# Patient Record
Sex: MALE | Race: WHITE
[De-identification: names, ages, dates, MRNs, and addresses within clinical notes are randomized per-mention and may not be internally consistent; named-entity substitution may affect disease eponyms.]

---

## 2021-03-08 ENCOUNTER — HOSPITAL ENCOUNTER (OUTPATIENT)
Dept: HOSPITAL 41 - JD.SDS | Age: 51
Setting detail: OBSERVATION
LOS: 1 days | Discharge: HOME | End: 2021-03-09
Attending: ORTHOPAEDIC SURGERY | Admitting: ORTHOPAEDIC SURGERY
Payer: COMMERCIAL

## 2021-03-08 DIAGNOSIS — M17.0: Primary | ICD-10-CM

## 2021-03-08 DIAGNOSIS — Z79.899: ICD-10-CM

## 2021-03-08 DIAGNOSIS — G89.18: ICD-10-CM

## 2021-03-08 DIAGNOSIS — Z20.822: ICD-10-CM

## 2021-03-08 DIAGNOSIS — I44.0: ICD-10-CM

## 2021-03-08 DIAGNOSIS — E66.9: ICD-10-CM

## 2021-03-08 DIAGNOSIS — R55: ICD-10-CM

## 2021-03-08 DIAGNOSIS — R35.1: ICD-10-CM

## 2021-03-08 DIAGNOSIS — I10: ICD-10-CM

## 2021-03-08 DIAGNOSIS — Z01.812: ICD-10-CM

## 2021-03-08 DIAGNOSIS — R73.9: ICD-10-CM

## 2021-03-08 DIAGNOSIS — N40.1: ICD-10-CM

## 2021-03-08 DIAGNOSIS — I49.1: ICD-10-CM

## 2021-03-08 PROCEDURE — C1713 ANCHOR/SCREW BN/BN,TIS/BN: HCPCS

## 2021-03-08 PROCEDURE — 27447 TOTAL KNEE ARTHROPLASTY: CPT

## 2021-03-08 PROCEDURE — 97116 GAIT TRAINING THERAPY: CPT

## 2021-03-08 PROCEDURE — G0378 HOSPITAL OBSERVATION PER HR: HCPCS

## 2021-03-08 PROCEDURE — 97161 PT EVAL LOW COMPLEX 20 MIN: CPT

## 2021-03-08 PROCEDURE — 97530 THERAPEUTIC ACTIVITIES: CPT

## 2021-03-08 PROCEDURE — 80053 COMPREHEN METABOLIC PANEL: CPT

## 2021-03-08 PROCEDURE — 97110 THERAPEUTIC EXERCISES: CPT

## 2021-03-08 PROCEDURE — 96365 THER/PROPH/DIAG IV INF INIT: CPT

## 2021-03-08 PROCEDURE — 85025 COMPLETE CBC W/AUTO DIFF WBC: CPT

## 2021-03-08 PROCEDURE — 93005 ELECTROCARDIOGRAM TRACING: CPT

## 2021-03-08 PROCEDURE — 36415 COLL VENOUS BLD VENIPUNCTURE: CPT

## 2021-03-08 PROCEDURE — C1776 JOINT DEVICE (IMPLANTABLE): HCPCS

## 2021-03-08 PROCEDURE — 97165 OT EVAL LOW COMPLEX 30 MIN: CPT

## 2021-03-08 PROCEDURE — 20610 DRAIN/INJ JOINT/BURSA W/O US: CPT

## 2021-03-08 PROCEDURE — 73560 X-RAY EXAM OF KNEE 1 OR 2: CPT

## 2021-03-08 PROCEDURE — 83735 ASSAY OF MAGNESIUM: CPT

## 2021-03-08 PROCEDURE — 96366 THER/PROPH/DIAG IV INF ADDON: CPT

## 2021-03-08 PROCEDURE — 84484 ASSAY OF TROPONIN QUANT: CPT

## 2021-03-08 RX ADMIN — CEFUROXIME SCH MG: 750 INJECTION, POWDER, FOR SOLUTION INTRAMUSCULAR; INTRAVENOUS at 07:44

## 2021-03-08 RX ADMIN — CEFUROXIME SCH MG: 750 INJECTION, POWDER, FOR SOLUTION INTRAMUSCULAR; INTRAVENOUS at 08:20

## 2021-03-08 RX ADMIN — TRIAMCINOLONE ACETONIDE ONE MG: 40 INJECTION, SUSPENSION INTRA-ARTICULAR; INTRAMUSCULAR at 08:37

## 2021-03-08 RX ADMIN — DEXTROSE ONE GM: 5 SOLUTION INTRAVENOUS at 08:25

## 2021-03-08 RX ADMIN — DEXTROSE ONE GM: 5 SOLUTION INTRAVENOUS at 07:44

## 2021-03-08 RX ADMIN — TRIAMCINOLONE ACETONIDE ONE MG: 40 INJECTION, SUSPENSION INTRA-ARTICULAR; INTRAMUSCULAR at 07:46

## 2021-03-08 NOTE — CR
Right knee: AP and lateral views of the right knee were obtained.

 

Comparison: Prior CT knee study of 01/29/21.

 

Right knee prosthesis is seen.  Components are aligned.  Patellar 

prosthesis is seen.  Soft tissue air is noted from the surgical 

procedure.  No acute osseous abnormality is appreciated.

 

Impression:

1.  Satisfactory postoperative radiographic appearance of recently 

placed right knee prosthesis.

 

Diagnostic code #2

## 2021-03-08 NOTE — PCM.SN.2
- Free Text/Narrative


Note: 





Right selective femoral nerve block at the adductor canal for post-procedure 

pain control under US guidance requested by Dr. Landers.


Time Out: 0855


Start: 0855


End: 0901





Chart reviewed.  Consent signed.  Questions answered. Appropriate monitors 

applied.  Time out performed. Right mid-shaft femur identified with ultrasound, 

scanning medially of femur, the femoral artery in the adductor canal visualized,

and the femoral nerve located laterally to the artery. The skin was prepped 

lateral to the ultrasound probe with chlorahexadine times two.  The 21ga 4 

insulated block needle was inserted under direct ultrasound guidance into the 

adductor canal.  25mL of 0.5% ropivacaine with 1:200,000 epinephrine was 

injected circumferentially around the nerve with intermittent negative 

aspiration noted.  Patient tolerated the procedure well.  Sterile technique 

noted along with sterile gloves, mask, and sterile probe cover.  See picture on 

progress note and vital signs on nurses notes.  Block completed in PACU.  


Silvestre Chavez CRNA

## 2021-03-08 NOTE — PCM.CONS
H&P History of Present Illness





- General


Date of Service: 03/08/21


Admit Problem/Dx: 


                           Admission Diagnosis/Problem





Admission Diagnosis/Problem      Irregular heart rhythm








Source of Information: Patient, Old Records, Provider, RN, RN Notes Reviewed, 

Significant Other


History Limitations: Reports: No Limitations





- History of Present Illness


Initial Comments - Free Text/Narative: 


Debora Chin is a 51 yo male patient of Dr. Landers who is post-operative day 0 of 

TKA with left knee cortisone injection. Hospital medicine was consulted for 

post-operative medical care after the patient had an episode of diaphoresis and 

possible near syncope postoperatively.  Patient was noted to have frequent PACs 

and a first-degree heart block on twelve-lead EKG.  Decision made to admit 

patient observation status for cardiac monitoring. At this time he is resting 

comfortably in bed. Pain is controlled.  He denies any chest pain, shortness of 

breath, palpitations, nausea, or vomiting.  He carries a history of: BPH, 

obesity, hypertension, hyperglycemia.  He is not a smoker.





He is a full code.  His primary care provider is Pat Fraser NP.








- Related Data


Allergies/Adverse Reactions: 


                                    Allergies











Allergy/AdvReac Type Severity Reaction Status Date / Time


 


No Known Allergies Allergy   Verified 03/08/21 06:34











Home Medications: 


                                    Home Meds





Acetaminophen [Tylenol Extra Strength] 1,000 mg PO Q6H PRN 03/07/21 [History]


Cholecalciferol (Vitamin D3) [Vitamin D3] 5,000 unit PO DAILY 03/07/21 [History]


Doxazosin [Cardura] 4 mg PO DAILY 03/07/21 [History]


Multivitamin 1 tab PO DAILY 03/07/21 [History]


Aspirin [Aspirin EC] 325 mg PO BID #84 tab 03/08/21 [Rx]


Cyclobenzaprine [Flexeril] 10 mg PO BID PRN #20 tab 03/08/21 [Rx]


oxyCODONE 5 - 10 mg PO Q4H PRN #40 tab 03/08/21 [Rx]











Past Medical History


HEENT History: Reports: None


Cardiovascular History: Reports: Hypertension, Other (See Below)


Other Cardiovascular History: right carotid artery bruit


Respiratory History: Reports: None


Gastrointestinal History: Reports: None


Genitourinary History: Reports: BPH, Renal Calculus


OB/GYN History: Reports: None


Musculoskeletal History: Reports: Osteoarthritis, Other (See Below)


Other Musculoskeletal History: left knee pain


Neurological History: Reports: None


Psychiatric History: Reports: None


Endocrine/Metabolic History: Reports: Obesity/BMI 30+


Hematologic History: Reports: None


Immunologic History: Reports: None


Oncologic (Cancer) History: Reports: None


Dermatologic History: Reports: None





- Infectious Disease History


Infectious Disease History: Reports: None





- Past Surgical History


Head Surgeries/Procedures: Reports: None


HEENT Surgical History: Reports: Oral Surgery


Cardiovascular Surgical History: Reports: None


Respiratory Surgical History: Reports: None


GI Surgical History: Reports: None


Male  Surgical History: Reports: None


Neurological Surgical History: Reports: None


Musculoskeletal Surgical History: Reports: None


Oncologic Surgical History: Reports: None


Dermatological Surgical History: Reports: None





Social & Family History





- Tobacco Use


Tobacco Use Status *Q: Never Tobacco User





- Caffeine Use


Caffeine Use: Reports: Coffee, Tea





- Recreational Drug Use


Recreational Drug Use: No


Drug Use in Last 12 Months: No





H&P Review of Systems





- Review of Systems:


Review Of Systems: See Below


General: Reports: Diaphoresis (improving ).  Denies: Fever, Chills, Malaise, 

Weakness, Fatigue


HEENT: Reports: No Symptoms.  Denies: Headaches, Sore Throat


Pulmonary: Reports: No Symptoms.  Denies: Shortness of Breath, Wheezing, 

Pleuritic Chest Pain, Cough, Sputum


Cardiovascular: Reports: No Symptoms.  Denies: Chest Pain, Palpitations, Dyspnea

 on Exertion


Gastrointestinal: Reports: No Symptoms.  Denies: Abdominal Pain, Constipation, 

Diarrhea, Nausea, Vomiting


Genitourinary: Reports: No Symptoms.  Denies: Pain


Musculoskeletal: Reports: Leg Pain


Skin: Reports: No Symptoms.  Denies: Cyanosis


Psychiatric: Reports: No Symptoms.  Denies: Confusion


Neurological: Reports: Numbness (2/2 anesthesia ), Difficulty Walking, Weakness,

 Gait Disturbance.  Denies: Confusion


Hematologic/Lymphatic: Reports: No Symptoms, Anemia


Immunologic: Reports: No Symptoms.  Denies: Anaphylaxis





Exam





- Exam


Exam: See Below





- Vital Signs


Vital Signs: 


                                Last Vital Signs











Temp  98.2 F   03/08/21 12:56


 


Pulse  78   03/08/21 11:47


 


Resp  19   03/08/21 11:47


 


BP  129/90   03/08/21 11:47


 


Pulse Ox  93 L  03/08/21 11:47











Weight: 229 lb 8.02 oz





- Exam


Quality Assessment: DVT Prophylaxis.  No: Supplemental Oxygen, Urinary Catheter


General: Alert, Oriented, Cooperative.  No: Mild Distress


HEENT: Conjunctiva Clear, EACs Clear, Mucosa Moist & Pink, Posterior Pharynx 

Clear


Neck: Supple, Trachea Midline


Lungs: Clear to Auscultation, Normal Respiratory Effort


Cardiovascular: Regular Rate, Irregular Rhythm


GI/Abdominal Exam: Normal Bowel Sounds, Soft, Non-Tender, No Distention


 (Male) Exam: Deferred


Rectal (Males) Exam: Deferred


Extremities: Normal Capillary Refill, Leg Pain, Limited Range of Motion, Other 

(Bandage in place on right leg.  Cooling pack in place.)


Peripheral Pulses: 3+: Radial (L), Radial (R), Dorsalis Pedis (L), Dorsalis 

Pedis (R)


Skin: Warm, Intact, Moist


Neurological: Cranial Nerves Intact (Grossly )


Neuro Extensive - Mental Status: Alert, Oriented x3, Normal Mood/Affect


Psychiatric: Alert, Normal Affect, Normal Mood





- Patient Data


Lab Results Last 24 hrs: 


                         Laboratory Results - last 24 hr











  03/08/21 Range/Units





  12:05 


 


Troponin I  < 0.017  (0.00-0.056)  ng/mL











Result Diagrams: 


                                 03/08/21 12:05





Sepsis Event Note





- Evaluation


Sepsis Screening Result: No Definite Risk





- Focused Exam


Vital Signs: 


                                   Vital Signs











  Temp Pulse Resp BP Pulse Ox Pulse Ox


 


 03/08/21 12:56  98.2 F     


 


 03/08/21 11:47   78  19  129/90  93 L 


 


 03/08/21 11:31   81  19  146/102 H  96 


 


 03/08/21 11:18   46 L  22 H  153/82 H  93 L 


 


 03/08/21 11:02   40 L  18  175/80 H  95 


 


 03/08/21 10:57   36 L  23 H  178/106 H  95 


 


 03/08/21 10:45   45 L  16  134/107 H  96 


 


 03/08/21 10:15  98.0 F  84  16  165/95 H  96 


 


 03/08/21 09:45  97.3 F  87  20  150/83 H  94 L 


 


 03/08/21 09:40  97.7 F  82  20  149/81 H  93 L 


 


 03/08/21 09:30       97


 


 03/08/21 09:25  97.7 F  84  12  148/86 H  96 


 


 03/08/21 09:20       96


 


 03/08/21 09:10   86  18  135/90  97 


 


 03/08/21 08:55  97.3 F  87  18  122/76  97 


 


 03/08/21 08:46       92 L


 


 03/08/21 08:42  98.2 F  97  16  113/63  96 


 


 03/08/21 06:36  97.7 F  99  16  148/82 H  99 














Consult PN Assessment/Plan


POD#: 0


Procedures: 


Procedures





CT LOWER EXTREMITY W/O DYE (01/29/21)








(1) S/P total knee arthroplasty


SNOMED Code(s): 7976046641182, 494423786, 0726982899754


   Code(s): Z96.659 - PRESENCE OF UNSPECIFIED ARTIFICIAL KNEE JOINT   Priority: 

High   Current Visit: Yes   


Qualifiers: 


   Laterality: right   Qualified Code(s): Z96.651 - Presence of right artificial

 knee joint   





(2) BPH (benign prostatic hyperplasia)


SNOMED Code(s): 995392035


   Code(s): N40.0 - BENIGN PROSTATIC HYPERPLASIA WITHOUT LOWER URINRY TRACT SYMP

   Priority: Low   Current Visit: No   


Qualifiers: 


   Lower urinary tract symptom presence: symptoms absent   Qualified Code(s): 

N40.0 - Benign prostatic hyperplasia without lower urinary tract symptoms   





(3) Obesity (BMI 30-39.9)


SNOMED Code(s): 354173078, 279069456


   Code(s): E66.9 - OBESITY, UNSPECIFIED   Priority: Medium   Current Visit: No 

  





(4) HTN (hypertension)


SNOMED Code(s): 36419855


   Code(s): I10 - ESSENTIAL (PRIMARY) HYPERTENSION   Priority: Medium   Current 

Visit: No   


Qualifiers: 


   Hypertension type: unspecified   Qualified Code(s): I10 - Essential (primary)

 hypertension   





(5) Hyperglycemia


SNOMED Code(s): 33529019


   Code(s): R73.9 - HYPERGLYCEMIA, UNSPECIFIED   Priority: Medium   Current 

Visit: No   





(6) Near syncope


SNOMED Code(s): 977294013


   Code(s): R55 - SYNCOPE AND COLLAPSE   Priority: High   Current Visit: Yes   





(7) PAC (premature atrial contraction)


SNOMED Code(s): 972658783


   Code(s): I49.1 - ATRIAL PREMATURE DEPOLARIZATION   Priority: High   Current 

Visit: Yes   





(8) First degree AV block


SNOMED Code(s): 577025479


   Code(s): I44.0 - ATRIOVENTRICULAR BLOCK, FIRST DEGREE   Priority: High   

Current Visit: Yes   


Problem List Initiated/Reviewed/Updated: Yes


My Orders Last 24 Hours: 


My Active Orders





03/08/21 11:42


Consult to Physician [CONS] Urgent 





03/08/21 12:07


Notify Provider Consults [RC] ASDIRECTED 





03/08/21 12:31


Admission Status [Patient Status] [ADT] Routine 





03/08/21 12:55


COMPREHENSIVE METABOLIC PN,CMP [CHEM] Routine 


MAGNESIUM [CHEM] Routine 





03/08/21 12:56


Consult to Occupational Therapy [OT Evaluation and Treatment] [CONS] Routine 


PT Evaluation and Treatment [CONS] Routine 





03/08/21 16:00


EKG 12 Lead [EKG Documentation Completion] [RC] ROUTINE 


TROPONIN I [CHEM] Q4H 





03/08/21 20:00


TROPONIN I [CHEM] Q4H 





03/09/21 05:11


CBC WITH AUTO DIFF [HEME] AM 


CMP [COMPREHENSIVE METABOLIC PN,CMP] [CHEM] AM 


MAGNESIUM [CHEM] AM 





03/09/21 09:00


Cholecalciferol (Vitamin D3)   5,000 unit PO DAILY 


Doxazosin [Cardura]   4 mg PO DAILY 


Multivitamin [Multivitamin]   1 tab PO DAILY 





03/10/21 05:11


CBC WITH AUTO DIFF [HEME] AM 


CMP [COMPREHENSIVE METABOLIC PN,CMP] [CHEM] AM 


MAGNESIUM [CHEM] AM 





03/11/21 05:11


CBC WITH AUTO DIFF [HEME] AM 


CMP [COMPREHENSIVE METABOLIC PN,CMP] [CHEM] AM 


MAGNESIUM [CHEM] AM 





03/12/21 05:11


CBC WITH AUTO DIFF [HEME] AM 


MAGNESIUM [CHEM] AM 











Plan: 


I/P:





Acute:





S/P right total knee arthroplasty - post-operative day 0


   -DVT prophylaxis and pain management per primary care team


   -PT/OT 


   -IS/RT


   -Monitor oxygen saturation


   -Titrate oxygen as needed


   -Home medications reviewed


   -Vital signs stable 


   -Monitor labs


      -Pre-operative Hgb was 14.8


      -Pre-operative GFR was 81


      -Pre-operative A1C was 6.4





Osteoarthritis of bilateral knees


   -Pain management per primary care team





S/P left knee steroid injection


   -Management per primary team





Near-syncope


Frequent PACs


First degree HB


   -Initial troponin negative


   -Telemetry


   -Check electrolytes


   -Observation status


   -Repeat 12-lead EKG tonight


   -Trend troponin


   -Recent stress test negative 





Chronic:


BPH


Obesity


HTN


Hyperglycemia





Plan:


CM for discharge planning


GI prophylaxis 


Home medications as indicated


Other orders as listed above 


Routine AM labs





He is a full code. His PCP is Pat Fraser NP





Thank you for allowing us to participate in the care of this patient!! 








Requesting Provider: Dr. Landers 


Date Consult Requested: 03/08/21


Patient History Reviewed: Yes


Admission H&P Reviewed: Yes


Notified Requestor: Yes


Time Spent (in minutes): 45

## 2021-03-08 NOTE — PCM.PREANE
Preanesthetic Assessment





- Procedure


Proposed Procedure: 





right total knee








- Anesthesia/Transfusion/Family Hx


Anesthesia History: Prior Anesthesia Without Reaction


Family History of Anesthesia Reaction: No


Transfusion History: No Prior Transfusion(s)





- Review of Systems


General: No Symptoms


Pulmonary: No Symptoms


Cardiovascular: No Symptoms


Gastrointestinal: No Symptoms


Neurological: No Symptoms


Other: Reports: None





- Physical Assessment


NPO Status Date: 03/07/21


NPO Status Time: 00:00


Vital Signs: 





                                Last Vital Signs











Temp  36.5 C   03/08/21 06:36


 


Pulse  99   03/08/21 06:36


 


Resp  16   03/08/21 06:36


 


BP  148/82 H  03/08/21 06:36


 


Pulse Ox  99   03/08/21 06:36











Height: 1.65 m


Weight: 104.1 kg


ASA Class: 2


Mental Status: Alert & Oriented x3


Airway Class: Mallampati = 2


Dentition: Reports: Normal Dentition


Thyro-Mental Finger Breadths: 3


Mouth Opening Finger Breadths: 3


ROM/Head Extension: Full


Lungs: Clear to Auscultation, Normal Respiratory Effort


Cardiovascular: Regular Rate, Regular Rhythm





- Allergies


Allergies/Adverse Reactions: 


                                    Allergies











Allergy/AdvReac Type Severity Reaction Status Date / Time


 


No Known Allergies Allergy   Verified 03/08/21 06:34














- Blood


Blood Available: No


Product(s) Available: None





- Anesthesia Plan


Pre-Op Medication Ordered: None





- Acknowledgements


Anesthesia Type Planned: Spinal


Pt an Appropriate Candidate for the Planned Anesthesia: Yes


Alternatives and Risks of Anesthesia Discussed w Pt/Guardian: Yes


Pt/Guardian Understands and Agrees with Anesthesia Plan: Yes





PreAnesthesia Questionnaire


HEENT History: Reports: None


Cardiovascular History: Reports: Hypertension, Other (See Below)


Other Cardiovascular History: right carotid artery bruit


Respiratory History: Reports: None


Gastrointestinal History: Reports: None


Genitourinary History: Reports: BPH, Renal Calculus


OB/GYN History: Reports: None


Musculoskeletal History: Reports: Osteoarthritis, Other (See Below)


Other Musculoskeletal History: left knee pain


Neurological History: Reports: None


Psychiatric History: Reports: None


Endocrine/Metabolic History: Reports: Obesity/BMI 30+


Hematologic History: Reports: None


Immunologic History: Reports: None


Oncologic (Cancer) History: Reports: None


Dermatologic History: Reports: None





- Infectious Disease History


Infectious Disease History: Reports: None





- Past Surgical History


Head Surgeries/Procedures: Reports: None


HEENT Surgical History: Reports: Oral Surgery


Cardiovascular Surgical History: Reports: None


Respiratory Surgical History: Reports: None


GI Surgical History: Reports: None


Female  Surgical History: Reports: None


Male  Surgical History: Reports: None


Neurological Surgical History: Reports: None


Musculoskeletal Surgical History: Reports: None


Oncologic Surgical History: Reports: None


Dermatological Surgical History: Reports: None





- SUBSTANCE USE


Tobacco Use Status *Q: Never Tobacco User


Recreational Drug Use History: No





- HOME MEDS


Home Medications: 


                                    Home Meds





Acetaminophen [Tylenol Extra Strength] 1,000 mg PO Q6H PRN 03/07/21 [History]


Cholecalciferol (Vitamin D3) [Vitamin D3] 5,000 unit PO DAILY 03/07/21 [History]


Doxazosin [Cardura] 4 mg PO DAILY 03/07/21 [History]


Multivitamin 1 tab PO DAILY 03/07/21 [History]


Aspirin [Aspirin EC] 325 mg PO BID #84 tab 03/08/21 [Rx]


Cyclobenzaprine [Flexeril] 10 mg PO BID PRN #20 tab 03/08/21 [Rx]


oxyCODONE 5 - 10 mg PO Q4H PRN #40 tab 03/08/21 [Rx]











- CURRENT (IN HOUSE) MEDS


Current Meds: 





                               Current Medications





Acetaminophen (Tylenol)  975 mg PO ONETIME [facility]


   Stop: 03/08/21 12:00


   Last Admin: 03/08/21 06:31 Dose:  975 mg


   Documented by: 


Morphine Sulfate 8 mg/Epinephrine HCl 0.3 mg/Cefuroxime Sodium 750 mg/Ketorolac 

Tromethamine 30 mg/Sodium Chloride 7.9 ml  0 mg .XX ONETIME [facility]


   Stop: 03/08/21 16:00


   Last Admin: 03/08/21 07:44 Dose:  788.3 mg


   Documented by: 


Lactated Ringer's (Ringers, Lactated)  1,000 mls @ 125 mls/hr IV ASDIRECTED [facility]


   Stop: 03/08/21 23:00


   Last Admin: 03/08/21 06:30 Dose:  125 mls/hr


   Documented by: 


Lidocaine/Sodium Bicarbonate (Buffered Lidocaine 1% In Ns 8.4%)  0.25 ml IDERM 

ONETIME PRN


   PRN Reason: Prior to IV Start


   Stop: 03/08/21 18:00


   Last Admin: 03/08/21 06:30 Dose:  0.25 ml


   Documented by: 


Oxycodone HCl (Oxycontin)  10 mg PO ONETIME [facility]


   Stop: 03/08/21 12:00


   Last Admin: 03/08/21 06:31 Dose:  10 mg


   Documented by: 


Pregabalin (Lyrica)  50 mg PO ONETIME [facility]


   Stop: 03/08/21 12:00


   Last Admin: 03/08/21 06:31 Dose:  50 mg


   Documented by: 


Sodium Chloride (Saline Flush)  10 ml FLUSH ASDIRECTED PRN


   PRN Reason: Keep Vein Open


   Stop: 03/08/21 18:00





Discontinued Medications





Bupivacaine HCl (Sensorcaine-Mpf 0.25%) Confirm Administered Dose 10 ml .ROUTE 

.STK-MED ONE


   Stop: 03/08/21 06:49


   Last Admin: 03/08/21 07:46 Dose:  4 ml


   Documented by: 


Cefazolin Sodium (Ancef) Confirm Administered Dose 2 gm .ROUTE .STK-MED ONE


   Stop: 03/08/21 06:37


Ephedrine Sulfate (Ephedrine Sulfate) Confirm Administered Dose 50 mg .ROUTE 

.STK-MED ONE


   Stop: 03/08/21 07:58


Epinephrine HCl (Adrenalin) Confirm Administered Dose 1 mg .ROUTE .STK-MED ONE


   Stop: 03/08/21 07:47


Fentanyl (Sublimaze) Confirm Administered Dose 100 mcg .ROUTE .STK-MED ONE


   Stop: 03/08/21 06:36


Lidocaine HCl (Xylocaine-Mpf 1%) Confirm Administered Dose 4 mls @ as directed 

.ROUTE .STK-MED ONE


   Stop: 03/08/21 06:36


Lactated Ringer's (Ringers, Lactated) Confirm Administered Dose 1,000 mls @ as 

directed .ROUTE .STK-MED ONE


   Stop: 03/08/21 07:13


Ketorolac Tromethamine (Toradol) Confirm Administered Dose 30 mg .ROUTE .STK-MED

 ONE


   Stop: 03/08/21 06:36


Labetalol HCl (Normodyne) Confirm Administered Dose 100 mg .ROUTE .STK-MED ONE


   Stop: 03/08/21 07:12


Midazolam HCl (Versed 1 Mg/Ml) Confirm Administered Dose 2 mg .ROUTE .STK-MED 

ONE


   Stop: 03/08/21 06:36


Ondansetron HCl (Zofran) Confirm Administered Dose 4 mg .ROUTE .STK-MED ONE


   Stop: 03/08/21 06:35


Propofol (Diprivan  20 Ml) Confirm Administered Dose 200 mg .ROUTE .STK-MED ONE


   Stop: 03/08/21 06:35


Propofol (Diprivan  20 Ml) Confirm Administered Dose 200 mg .ROUTE .STK-MED ONE


   Stop: 03/08/21 07:24


Propofol (Diprivan  20 Ml) Confirm Administered Dose 200 mg .ROUTE .STK-MED ONE


   Stop: 03/08/21 07:45


Propofol (Diprivan  20 Ml) Confirm Administered Dose 200 mg .ROUTE .STK-MED ONE


   Stop: 03/08/21 08:06


Propofol (Diprivan  20 Ml) Confirm Administered Dose 200 mg .ROUTE .STK-MED ONE


   Stop: 03/08/21 08:27


Ropivacaine (Naropin 0.5%) Confirm Administered Dose 30 ml .ROUTE .STK-MED ONE


   Stop: 03/08/21 07:46


Tranexamic Acid (Cyklokapron) Confirm Administered Dose 1,000 mg .ROUTE .STK-MED

 ONE


   Stop: 03/08/21 06:03


   Last Admin: 03/08/21 07:44 Dose:  1,000 mg


   Documented by: 


Triamcinolone Acetonide (Kenalog-40) Confirm Administered Dose 80 mg .ROUTE 

.STK-MED ONE


   Stop: 03/08/21 06:49


   Last Admin: 03/08/21 07:46 Dose:  80 mg


   Documented by: 


Vancomycin HCl (Vancomycin) Confirm Administered Dose 1 gm .ROUTE .STK-MED ONE


   Stop: 03/08/21 06:03


   Last Admin: 03/08/21 07:44 Dose:  1 gm


   Documented by:

## 2021-03-08 NOTE — PCM.POSTAN
POST ANESTHESIA ASSESSMENT





- MENTAL STATUS


Mental Status: Alert, Oriented





- VITAL SIGNS


Vital Signs: 


                                Last Vital Signs











Temp  36.5 C   03/08/21 06:36


 


Pulse  99   03/08/21 06:36


 


Resp  16   03/08/21 06:36


 


BP  148/82 H  03/08/21 06:36


 


Pulse Ox  99   03/08/21 06:36














- RESPIRATORY


Respiratory Status: Respiratory Rate WNL, Airway Patent, O2 Saturation Stable, 

Supplemental Oxygen





- CARDIOVASCULAR


CV Status: Pulse Rate WNL, Blood Pressure Stable





- GASTROINTESTINAL


GI Status: No Symptoms





- PAIN


Pain Score: 0





- POST OP HYDRATION


Hydration Status: Adequate & Stable





- OBSERVATIONS


Free Text/Narrative:: 





no anesthesia complications noted

## 2021-03-08 NOTE — PCM.OPNOTE
- General Post-Op/Procedure Note


Date of Surgery/Procedure: 03/08/21


Operative Procedure(s): right total knee arthroplasty with [employer] with 

left knee steroid injection


Pre Op Diagnosis: bilateral knee osteoarthrosis


Post-Op Diagnosis: Same


Anesthesia Technique: Local, MAC, Spinal


Primary Surgeon: Eder Landers


Anesthesia Provider: Silvestre Chavez


Assistant: Jennifer Olsen


Assistant: Gema Combs


EBL in mLs: 150


Complications: None


Condition: Good


Free Text/Narrative:: 


5/5


9mm


35x10

## 2021-03-09 NOTE — [id number]
Post Anesthesia Note





- EVALUATION WITHIN 48HRS OF ANESTHETIC


Vital Signs in Normal Range: Yes


Patient Participated in Evaluation: Yes


Respiratory Function Stable: Yes


Airway Patent: Yes


Cardiovascular Function Stable: Yes


Hydration Status Stable: Yes


Pain Control Satisfactory: Yes


Nausea and Vomiting Control Satisfactory: Yes


Mental Status Recovered: Yes


Vital Signs: 


                                Last Vital Signs











Temp  37.2 C   03/09/21 03:17


 


Pulse  99   03/09/21 03:17


 


Resp  18   03/09/21 03:17


 


BP  154/85 H  03/09/21 03:17


 


Pulse Ox  96   03/09/21 03:17














- COMMENTS/OBSERVATIONS


Free Text/Narrative:: 





no anesthesia complications noted

## 2021-03-09 NOTE — [id number]
**Discharge Summary





- Hospital Course


Brief History: Debora is a 51 yo male who underwent right TKA with left knee 

cortisone injection with Dr. Landers on 3-8-2021.  The procedure was completed 

under spinal anesthesia with sedation and a post-operative adductor canal block 

was provided.  The pt tolerated the procedure well, however, was noted to have 

frequent PACs and first degree heart block in PACU.  The patient was evaluated 

by the Hospitalist service and was admitted to the Medical-Surgical Unit with 

telemetry.  Medical management was provided by the Hospitalist service.  The 

pt's Hgb on POD#1 was 13.9.  On POD#1, 325mg ASA BID was initiated for VTE 

prophylaxis.  SCDs and TEDs were also ordered.  A Mepilex dressing was placed at

the incision sites at the time of surgery and remained clean and dry.  The pt 

participated in P.T. and O.T. and progressed well.  The pt was allowed to WBAT 

and used a FWW for mobility.  On POD#1, the pt was deemed appropriate to 

discharge to home with his wife as no further acute cardiac concerns were 

identified.  The patient will follow-up with his primary provider and will att

end outpatient physical therapy.


Diagnosis: Stroke: No





- Discharge Data


Discharge Date: 03/09/21


Discharge Disposition: Home, Self-Care 01


Condition: Good





- Referral to Home Health


Primary Care Physician: 


Thad Simon MD








- Patient Summary/Data


Operative Procedure(s) Performed: right total knee arthroplasty with michael 

robotics with left knee steroid injection


Consults: 


                                  Consultations





03/08/21 11:42


Consult to Physician [CONS] Urgent 





03/08/21 12:56


Consult to Occupational Therapy [OT Evaluation and Treatment] [CONS] Routine 


PT Evaluation and Treatment [CONS] Routine 














- Patient Instructions


Diet: Usual Diet as Tolerated


Activity: Apply Ice, As Tolerated, Elevate Extremity, Full Weight Bearing


Driving: Do Not Drive


Showering/Bathing: May Shower


Wound/Incision Care: Keep Operative Site/Wound Site Clean and Dry, Do NOT Change

 Dressing


Notify Provider of: Fever, Increased Pain, Swelling and Redness, Drainage, 

Nausea and/or Vomiting


Other/Special Instructions: Please get up and moving around EVERY HOUR while 

awake.  This helps to prevent blood clots.  Please use your walker and have help

 with mobility as needed.  Take a short walk in your home every hour while 

awake.  Starting on 3-9-2021, please take 325mg aspirin TWICE daily.  The 

aspirin is being used for blood clot prevention and not for pain management so 

please do not miss a dose of the medication.  You could use a medication like 

Pepcid or Tagamet and a medication like Prilosec or Nexium to protect your 

stomach while you are using the aspirin.  At home, please complete the exercises

 that you learned after surgery.  Schedule for physical therapy.  Use the pain 

medication as needed.  The medication may cause drowsiness and constipation.  

Contact your primary care provider for instructions if you are constipated.  You

 may use a stool softener like docusate sodium or Colace 100mg twice daily 

and/or a laxative like Miralax daily for constipation.  Increase your water and 

fiber intake while you are using the pain medication.  Please discontinue use of

 the prescription pain medication as soon as able.  ****The goal is to use the 

least amount of prescription pain medication as possible and to discontinue use 

of the prescription pain medication as soon as possible.  Please do not use 

other medications that may cause drowsiness (other pain medications, anxiety 

pills, cold medications, sleeping pills, etc) while using the prescription pain 

medication.  Do not use alcohol while using the pain medication.  If your 

primary care provider allows, you may use acetaminophen or Tylenol for pain 

management, however, please ensure you are not using over 4000 mg or 4 grams of 

acetaminophen per day.  Discuss use of acetaminophen with your primary care 

provider.  At this time, please do not use ibuprofen (Motrin, Advil) or naproxen

 (Aleve) for pain management as you are using the aspirin.  When the aspirin 

course is completed in 4 to 6 weeks, you could use ibuprofen or naproxen for 

pain management (if this is allowed by your primary care provider).  On the day 

following surgery, you may remove the ACE bandage on the surgical limb and put 

on the EDUARDO hose.  If you can tolerate use of the EDUARDO hose, wear them during the 

day and remove them at night.  Elevate the limb to decrease swelling.  Elevating

 the limb above the level of the heart will be most effective.  Elevating the 

foot higher than the knee will help to decrease swelling in the foot.  Place ice

 to the area often.  Place a towel between your skin and the blue pad.  Please 

keep the dressing in place until follow-up.  As long as the dressing is sealed 

and without a hole, the dressing is water resistant and therefore, you may have 

a shower.  Please do not soak that dressing in a tub, pool, whirlpool.  Notify 

the Clinic if the dressing becomes saturated.  Increase your protein intake 

while you are healing.  It is normal to have swelling and bruising at the 

surgical site, as well as above and below the surgical site.  If you have nimisha

betes or have been instructed by your primary care provider to monitor your 

blood sugars, please closely monitor your sugars.  Notify your primary care 

provider of the values.  Elevated sugars can increase the risk of infection.  If

 you have questions or concerns, please call [phone] and leave a message 

for the nurse.  Your call will be returned.  Follow-up with primary care 

provider within 5-7 days of discharge, sooner if needed.





- Discharge Plan


*PRESCRIPTION DRUG MONITORING PROGRAM REVIEWED*: No


*COPY OF PRESCRIPTION DRUG MONITORING REPORT IN PATIENT KANWAL: No


Prescriptions/Med Rec: 


Aspirin [Aspirin EC] 325 mg PO BID #84 tab


Cyclobenzaprine [Flexeril] 10 mg PO BID PRN #20 tab


 PRN Reason: Spasms


oxyCODONE 5 - 10 mg PO Q4H PRN #40 tab


 PRN Reason: Pain


Home Medications: 


                                    Home Meds





Acetaminophen [Tylenol Extra Strength] 1,000 mg PO Q6H PRN 03/07/21 [History]


Cholecalciferol (Vitamin D3) [Vitamin D3] 5,000 unit PO DAILY 03/07/21 [History]


Doxazosin [Cardura] 4 mg PO DAILY 03/07/21 [History]


Multivitamin 1 tab PO DAILY 03/07/21 [History]


Aspirin [Aspirin EC] 325 mg PO BID #84 tab 03/08/21 [Rx]


Cyclobenzaprine [Flexeril] 10 mg PO BID PRN #20 tab 03/08/21 [Rx]


oxyCODONE 5 - 10 mg PO Q4H PRN #40 tab 03/08/21 [Rx]








Patient Handouts:  Cyclobenzaprine tablets, Oxycodone tablets or capsules, Total

 Knee Replacement, Care After, Aspirin, ASA oral tablets


Referrals: 


Thad Simon MD [Primary Care Provider] - 03/17/21 3:30 pm


(Go to the [facility] to be seen.


)


Jennifer Olsen PA-C [Physician Assistant] - 


(Debora, you are scheduled to followup with Jennifer Olsen PA-C as listed:





-- March 16th at 11:45am


-- March 26th at 11:45am


-- April 20th at 11:00am)





- Discharge Summary/Plan Comment


DC Time >30 min.: No





- Patient Data


Vitals - Most Recent: 


                                Last Vital Signs











Temp  99.0 F   03/09/21 03:17


 


Pulse  95   03/09/21 07:52


 


Resp  16   03/09/21 07:52


 


BP  153/99 H  03/09/21 08:08


 


Pulse Ox  95   03/09/21 07:52











Weight - Most Recent: 234 lb


I&O - Last 24 hours: 


                                 Intake & Output











 03/09/21 03/09/21 03/09/21





 06:59 14:59 22:59


 


Intake Total 850  


 


Balance 850  











Lab Results - Last 24 hrs: 


                         Laboratory Results - last 24 hr











  03/08/21 03/08/21 03/09/21 Range/Units





  16:24 20:00 06:10 


 


WBC    15.20 H  (4.23-9.07)  K/mm3


 


RBC    4.57 L  (4.63-6.08)  M/mm3


 


Hgb    13.9  (13.7-17.5)  gm/dl


 


Hct    41.7  (40.1-51.0)  %


 


MCV    91.2  (79.0-92.2)  fl


 


MCH    30.4  (25.7-32.2)  pg


 


MCHC    33.3  (32.2-35.5)  g/dl


 


RDW Std Deviation    42.0  (35.1-43.9)  fL


 


Plt Count    258  (163-337)  K/mm3


 


MPV    10.6  (9.4-12.3)  fl


 


Neut % (Auto)    86.9 H  (34.0-67.9)  %


 


Lymph % (Auto)    4.7 L  (21.8-53.1)  %


 


Mono % (Auto)    8.3  (5.3-12.2)  %


 


Eos % (Auto)    0 L  (0.8-7.0)  


 


Baso % (Auto)    0.0 L  (0.1-1.2)  %


 


Neut # (Auto)    13.21 H  (1.78-5.38)  K/mm3


 


Lymph # (Auto)    0.71 L  (1.32-3.57)  K/mm3


 


Mono # (Auto)    1.26 H  (0.30-0.82)  K/mm3


 


Eos # (Auto)    0.00 L  (0.04-0.54)  K/mm3


 


Baso # (Auto)    0.00 L  (0.01-0.08)  K/mm3


 


Manual Slide Review    Abnormal smear  


 


Sodium     (136-145)  mEq/L


 


Potassium     (3.5-5.1)  mEq/L


 


Chloride     ()  mEq/L


 


Carbon Dioxide     (21-32)  mEq/L


 


Anion Gap     (5-15)  


 


BUN     (7-18)  mg/dL


 


Creatinine     (0.7-1.3)  mg/dL


 


Est Cr Clr Drug Dosing     mL/min


 


Estimated GFR (MDRD)     (>60)  mL/min


 


BUN/Creatinine Ratio     (14-18)  


 


Glucose     ()  mg/dL


 


Calcium     (8.5-10.1)  mg/dL


 


Magnesium     (1.8-2.4)  mg/dl


 


Total Bilirubin     (0.2-1.0)  mg/dL


 


AST     (15-37)  U/L


 


ALT     (16-63)  U/L


 


Alkaline Phosphatase     ()  U/L


 


Troponin I  < 0.017  < 0.017   (0.00-0.056)  ng/mL


 


Total Protein     (6.4-8.2)  g/dl


 


Albumin     (3.4-5.0)  g/dl


 


Globulin     gm/dL


 


Albumin/Globulin Ratio     (1-2)  














  03/09/21 Range/Units





  06:10 


 


WBC   (4.23-9.07)  K/mm3


 


RBC   (4.63-6.08)  M/mm3


 


Hgb   (13.7-17.5)  gm/dl


 


Hct   (40.1-51.0)  %


 


MCV   (79.0-92.2)  fl


 


MCH   (25.7-32.2)  pg


 


MCHC   (32.2-35.5)  g/dl


 


RDW Std Deviation   (35.1-43.9)  fL


 


Plt Count   (163-337)  K/mm3


 


MPV   (9.4-12.3)  fl


 


Neut % (Auto)   (34.0-67.9)  %


 


Lymph % (Auto)   (21.8-53.1)  %


 


Mono % (Auto)   (5.3-12.2)  %


 


Eos % (Auto)   (0.8-7.0)  


 


Baso % (Auto)   (0.1-1.2)  %


 


Neut # (Auto)   (1.78-5.38)  K/mm3


 


Lymph # (Auto)   (1.32-3.57)  K/mm3


 


Mono # (Auto)   (0.30-0.82)  K/mm3


 


Eos # (Auto)   (0.04-0.54)  K/mm3


 


Baso # (Auto)   (0.01-0.08)  K/mm3


 


Manual Slide Review   


 


Sodium  142  (136-145)  mEq/L


 


Potassium  4.3  (3.5-5.1)  mEq/L


 


Chloride  105  ()  mEq/L


 


Carbon Dioxide  27  (21-32)  mEq/L


 


Anion Gap  14.3  (5-15)  


 


BUN  15  (7-18)  mg/dL


 


Creatinine  1.0  (0.7-1.3)  mg/dL


 


Est Cr Clr Drug Dosing  76.02  mL/min


 


Estimated GFR (MDRD)  > 60  (>60)  mL/min


 


BUN/Creatinine Ratio  15.0  (14-18)  


 


Glucose  199 H  ()  mg/dL


 


Calcium  8.8  (8.5-10.1)  mg/dL


 


Magnesium  2.1  (1.8-2.4)  mg/dl


 


Total Bilirubin  0.5  (0.2-1.0)  mg/dL


 


AST  19  (15-37)  U/L


 


ALT  31  (16-63)  U/L


 


Alkaline Phosphatase  81  ()  U/L


 


Troponin I   (0.00-0.056)  ng/mL


 


Total Protein  6.6  (6.4-8.2)  g/dl


 


Albumin  3.4  (3.4-5.0)  g/dl


 


Globulin  3.2  gm/dL


 


Albumin/Globulin Ratio  1.1  (1-2)  











Med Orders - Current: 


                               Current Medications





Aspirin (Ecotrin)  325 mg PO BID [facility]


Bisacodyl (Dulcolax)  5 mg PO DAILY PRN


   PRN Reason: Constipation


Cholecalciferol (Vitamin D3)  5,000 unit PO DAILY [facility]


   Last Admin: 03/09/21 08:08 Dose:  5,000 unit


   Documented by: 


Cyclobenzaprine HCl (Flexeril)  10 mg PO BID PRN


   PRN Reason: Muscle Spasm


Docusate Sodium (Colace)  100 mg PO BID PRN


   PRN Reason: Constipation


Doxazosin Mesylate (Cardura)  4 mg PO DAILY [facility]


   Last Admin: 03/09/21 08:08 Dose:  4 mg


   Documented by: 


Famotidine (Pepcid)  20 mg PO Q12H [facility]


   Last Admin: 03/09/21 08:08 Dose:  20 mg


   Documented by: 


Cefazolin Sodium/Dextrose 2 gm (/ Premix)  50 mls @ 100 mls/hr IV Q8H [facility]


   Stop: 03/09/21 09:29


   Last Admin: 03/09/21 08:09 Dose:  100 mls/hr


   Documented by: 


Magnesium Hydroxide (Milk Of Magnesia)  30 ml PO BID PRN


   PRN Reason: Constipation


Morphine Sulfate (Morphine)  2 mg IVPUSH Q2H PRN


   PRN Reason: Breakthrough Pain


Multivitamins (Thera)  1 each PO DAILY [facility]


   Last Admin: 03/09/21 08:08 Dose:  1 each


   Documented by: 


Naloxone HCl (Narcan)  0.1 mg IVPUSH Q5M PRN


   PRN Reason: Oversedation


Ondansetron HCl (Zofran)  4 mg IVPUSH Q6H PRN


   PRN Reason: Nausea/Vomiting


Oxycodone HCl (Oxycodone)  5 - 10 mg PO Q4H PRN


   PRN Reason: Pain


   Last Admin: 03/09/21 08:08 Dose:  10 mg


   Documented by: 


Senna (Senna)  8.6 mg PO BID PRN


   PRN Reason: Constipation





Discontinued Medications





Acetaminophen (Tylenol)  975 mg PO ONETIME [facility]


   Stop: 03/08/21 12:00


   Last Admin: 03/08/21 06:31 Dose:  975 mg


   Documented by: 


Bupivacaine HCl (Sensorcaine-Mpf 0.25%) Confirm Administered Dose 10 ml .ROUTE 

.STK-MED ONE


   Stop: 03/08/21 06:49


   Last Admin: 03/08/21 08:37 Dose:  4 ml


   Documented by: 


Cefazolin Sodium (Ancef) Confirm Administered Dose 2 gm .ROUTE .STK-MED ONE


   Stop: 03/08/21 06:37


Morphine Sulfate 8 mg/Epinephrine HCl 0.3 mg/Cefuroxime Sodium 750 mg/Ketorolac 

Tromethamine 30 mg/Sodium Chloride 7.9 ml  0 mg .XX ONETIME [facility]


   Stop: 03/08/21 16:00


   Last Admin: 03/08/21 08:20 Dose:  788.3 mg


   Documented by: 


Ephedrine Sulfate (Ephedrine Sulfate) Confirm Administered Dose 50 mg .ROUTE 

.STK-MED ONE


   Stop: 03/08/21 07:58


Epinephrine HCl (Adrenalin) Confirm Administered Dose 1 mg .ROUTE .ST-MED ONE


   Stop: 03/08/21 07:47


Fentanyl (Sublimaze) Confirm Administered Dose 100 mcg .ROUTE .ST-MED ONE


   Stop: 03/08/21 06:36


Lactated Ringer's (Ringers, Lactated)  1,000 mls @ 125 mls/hr IV ASDIRECTED [facility]A


   Stop: 03/08/21 23:00


   Last Admin: 03/08/21 09:29 Dose:  125 mls/hr


   Documented by: 


Lidocaine HCl (Xylocaine-Mpf 1%) Confirm Administered Dose 4 mls @ as directed 

.ROUTE .ST-MED ONE


   Stop: 03/08/21 06:36


Lactated Ringer's (Ringers, Lactated) Confirm Administered Dose 1,000 mls @ as 

directed .ROUTE .ST-[facility] ONE


   Stop: 03/08/21 07:13


Ketorolac Tromethamine (Toradol) Confirm Administered Dose 30 mg .ROUTE .ST-MED

ONE


   Stop: 03/08/21 06:36


Labetalol HCl (Normodyne) Confirm Administered Dose 100 mg .ROUTE .ST-MED ONE


   Stop: 03/08/21 07:12


Lidocaine/Sodium Bicarbonate (Buffered Lidocaine 1% In Ns 8.4%)  0.25 ml IDERM 

ONETIME PRN


   PRN Reason: Prior to IV Start


   Stop: 03/08/21 18:00


   Last Admin: 03/08/21 06:30 Dose:  0.25 ml


   Documented by: 


Magnesium Oxide (Magnesium Oxide)  400 mg PO ONETIME ONE


   Stop: 03/08/21 15:46


   Last Admin: 03/08/21 16:20 Dose:  400 mg


   Documented by: 


Magnesium Oxide (Magnesium Oxide)  400 mg PO ONETIME ONE


   Stop: 03/08/21 21:01


   Last Admin: 03/08/21 21:23 Dose:  400 mg


   Documented by: 


Midazolam HCl (Versed 1 Mg/Ml) Confirm Administered Dose 2 mg .ROUTE .STK-MED 

ONE


   Stop: 03/08/21 06:36


Ondansetron HCl (Zofran) Confirm Administered Dose 4 mg .ROUTE .STK-MED ONE


   Stop: 03/08/21 06:35


Oxycodone HCl (Oxycontin)  10 mg PO ONETIME [facility]


   Stop: 03/08/21 12:00


   Last Admin: 03/08/21 06:31 Dose:  10 mg


   Documented by: 


Oxycodone HCl (Oxycodone)  5 - 10 mg PO Q4H PRN


   PRN Reason: Pain


Pregabalin (Lyrica)  50 mg PO ONETIME [facility]


   Stop: 03/08/21 12:00


   Last Admin: 03/08/21 06:31 Dose:  50 mg


   Documented by: 


Propofol (Diprivan  20 Ml) Confirm Administered Dose 200 mg .ROUTE .STK-MED ONE


   Stop: 03/08/21 06:35


Propofol (Diprivan  20 Ml) Confirm Administered Dose 200 mg .ROUTE .STK-MED ONE


   Stop: 03/08/21 07:24


Propofol (Diprivan  20 Ml) Confirm Administered Dose 200 mg .ROUTE .STK-MED ONE


   Stop: 03/08/21 07:45


Propofol (Diprivan  20 Ml) Confirm Administered Dose 200 mg .ROUTE .STK-MED ONE


   Stop: 03/08/21 08:06


Propofol (Diprivan  20 Ml) Confirm Administered Dose 200 mg .ROUTE .STK-MED ONE


   Stop: 03/08/21 08:27


Ropivacaine (Naropin 0.5%) Confirm Administered Dose 30 ml .ROUTE .STK-MED ONE


   Stop: 03/08/21 07:46


Sodium Chloride (Saline Flush)  10 ml FLUSH ASDIRECTED PRN


   PRN Reason: Keep Vein Open


   Stop: 03/08/21 18:00


Tranexamic Acid (Cyklokapron) Confirm Administered Dose 1,000 mg .ROUTE .STK-MED

ONE


   Stop: 03/08/21 06:03


   Last Admin: 03/08/21 08:25 Dose:  1,000 mg


   Documented by: 


Triamcinolone Acetonide (Kenalog-40) Confirm Administered Dose 80 mg .ROUTE 

.STK-MED ONE


   Stop: 03/08/21 06:49


   Last Admin: 03/08/21 08:37 Dose:  80 mg


   Documented by: 


Vancomycin HCl (Vancomycin) Confirm Administered Dose 1 gm .ROUTE .STK-MED ONE


   Stop: 03/08/21 06:03


   Last Admin: 03/08/21 08:25 Dose:  1 gm


   Documented by:

## 2021-03-09 NOTE — PCM.SURGPN
- General Info


Date of Service: 03/09/21


POD#: 1


Functional Status: Reports: Pain Controlled, Tolerating Diet, Ambulating, 

Urinating, Incentive Spirometry, Other (The pt states he is doing well and feels

prepared for d/c to home.)





- Patient Data


Vitals - Most Recent: 


                                Last Vital Signs











Temp  99.0 F   03/09/21 03:17


 


Pulse  95   03/09/21 07:52


 


Resp  16   03/09/21 07:52


 


BP  153/99 H  03/09/21 08:08


 


Pulse Ox  95   03/09/21 07:52











Weight - Most Recent: 234 lb


I&O - Last 24 Hours: 


                                 Intake & Output











 03/09/21 03/09/21 03/09/21





 06:59 14:59 22:59


 


Intake Total 850  


 


Balance 850  











Lab Results Last 24 Hrs: 


                         Laboratory Results - last 24 hr











  03/08/21 03/08/21 03/09/21 Range/Units





  16:24 20:00 06:10 


 


WBC    15.20 H  (4.23-9.07)  K/mm3


 


RBC    4.57 L  (4.63-6.08)  M/mm3


 


Hgb    13.9  (13.7-17.5)  gm/dl


 


Hct    41.7  (40.1-51.0)  %


 


MCV    91.2  (79.0-92.2)  fl


 


MCH    30.4  (25.7-32.2)  pg


 


MCHC    33.3  (32.2-35.5)  g/dl


 


RDW Std Deviation    42.0  (35.1-43.9)  fL


 


Plt Count    258  (163-337)  K/mm3


 


MPV    10.6  (9.4-12.3)  fl


 


Neut % (Auto)    86.9 H  (34.0-67.9)  %


 


Lymph % (Auto)    4.7 L  (21.8-53.1)  %


 


Mono % (Auto)    8.3  (5.3-12.2)  %


 


Eos % (Auto)    0 L  (0.8-7.0)  


 


Baso % (Auto)    0.0 L  (0.1-1.2)  %


 


Neut # (Auto)    13.21 H  (1.78-5.38)  K/mm3


 


Lymph # (Auto)    0.71 L  (1.32-3.57)  K/mm3


 


Mono # (Auto)    1.26 H  (0.30-0.82)  K/mm3


 


Eos # (Auto)    0.00 L  (0.04-0.54)  K/mm3


 


Baso # (Auto)    0.00 L  (0.01-0.08)  K/mm3


 


Manual Slide Review    Abnormal smear  


 


Sodium     (136-145)  mEq/L


 


Potassium     (3.5-5.1)  mEq/L


 


Chloride     ()  mEq/L


 


Carbon Dioxide     (21-32)  mEq/L


 


Anion Gap     (5-15)  


 


BUN     (7-18)  mg/dL


 


Creatinine     (0.7-1.3)  mg/dL


 


Est Cr Clr Drug Dosing     mL/min


 


Estimated GFR (MDRD)     (>60)  mL/min


 


BUN/Creatinine Ratio     (14-18)  


 


Glucose     ()  mg/dL


 


Calcium     (8.5-10.1)  mg/dL


 


Magnesium     (1.8-2.4)  mg/dl


 


Total Bilirubin     (0.2-1.0)  mg/dL


 


AST     (15-37)  U/L


 


ALT     (16-63)  U/L


 


Alkaline Phosphatase     ()  U/L


 


Troponin I  < 0.017  < 0.017   (0.00-0.056)  ng/mL


 


Total Protein     (6.4-8.2)  g/dl


 


Albumin     (3.4-5.0)  g/dl


 


Globulin     gm/dL


 


Albumin/Globulin Ratio     (1-2)  














  03/09/21 Range/Units





  06:10 


 


WBC   (4.23-9.07)  K/mm3


 


RBC   (4.63-6.08)  M/mm3


 


Hgb   (13.7-17.5)  gm/dl


 


Hct   (40.1-51.0)  %


 


MCV   (79.0-92.2)  fl


 


MCH   (25.7-32.2)  pg


 


MCHC   (32.2-35.5)  g/dl


 


RDW Std Deviation   (35.1-43.9)  fL


 


Plt Count   (163-337)  K/mm3


 


MPV   (9.4-12.3)  fl


 


Neut % (Auto)   (34.0-67.9)  %


 


Lymph % (Auto)   (21.8-53.1)  %


 


Mono % (Auto)   (5.3-12.2)  %


 


Eos % (Auto)   (0.8-7.0)  


 


Baso % (Auto)   (0.1-1.2)  %


 


Neut # (Auto)   (1.78-5.38)  K/mm3


 


Lymph # (Auto)   (1.32-3.57)  K/mm3


 


Mono # (Auto)   (0.30-0.82)  K/mm3


 


Eos # (Auto)   (0.04-0.54)  K/mm3


 


Baso # (Auto)   (0.01-0.08)  K/mm3


 


Manual Slide Review   


 


Sodium  142  (136-145)  mEq/L


 


Potassium  4.3  (3.5-5.1)  mEq/L


 


Chloride  105  ()  mEq/L


 


Carbon Dioxide  27  (21-32)  mEq/L


 


Anion Gap  14.3  (5-15)  


 


BUN  15  (7-18)  mg/dL


 


Creatinine  1.0  (0.7-1.3)  mg/dL


 


Est Cr Clr Drug Dosing  76.02  mL/min


 


Estimated GFR (MDRD)  > 60  (>60)  mL/min


 


BUN/Creatinine Ratio  15.0  (14-18)  


 


Glucose  199 H  ()  mg/dL


 


Calcium  8.8  (8.5-10.1)  mg/dL


 


Magnesium  2.1  (1.8-2.4)  mg/dl


 


Total Bilirubin  0.5  (0.2-1.0)  mg/dL


 


AST  19  (15-37)  U/L


 


ALT  31  (16-63)  U/L


 


Alkaline Phosphatase  81  ()  U/L


 


Troponin I   (0.00-0.056)  ng/mL


 


Total Protein  6.6  (6.4-8.2)  g/dl


 


Albumin  3.4  (3.4-5.0)  g/dl


 


Globulin  3.2  gm/dL


 


Albumin/Globulin Ratio  1.1  (1-2)  











Med Orders - Current: 


                               Current Medications





Aspirin (Ecotrin)  325 mg PO BID [facility]


Bisacodyl (Dulcolax)  5 mg PO DAILY PRN


   PRN Reason: Constipation


Cholecalciferol (Vitamin D3)  5,000 unit PO DAILY [facility]


   Last Admin: 03/09/21 08:08 Dose:  5,000 unit


   Documented by: 


Cyclobenzaprine HCl (Flexeril)  10 mg PO BID PRN


   PRN Reason: Muscle Spasm


Docusate Sodium (Colace)  100 mg PO BID PRN


   PRN Reason: Constipation


Doxazosin Mesylate (Cardura)  4 mg PO DAILY [facility]


   Last Admin: 03/09/21 08:08 Dose:  4 mg


   Documented by: 


Famotidine (Pepcid)  20 mg PO Q12H [facility]


   Last Admin: 03/09/21 08:08 Dose:  20 mg


   Documented by: 


Cefazolin Sodium/Dextrose 2 gm (/ Premix)  50 mls @ 100 mls/hr IV Q8H [facility]


   Stop: 03/09/21 09:29


   Last Admin: 03/09/21 08:09 Dose:  100 mls/hr


   Documented by: 


Magnesium Hydroxide (Milk Of Magnesia)  30 ml PO BID PRN


   PRN Reason: Constipation


Morphine Sulfate (Morphine)  2 mg IVPUSH Q2H PRN


   PRN Reason: Breakthrough Pain


Multivitamins (Thera)  1 each PO DAILY [facility]


   Last Admin: 03/09/21 08:08 Dose:  1 each


   Documented by: 


Naloxone HCl (Narcan)  0.1 mg IVPUSH Q5M PRN


   PRN Reason: Oversedation


Ondansetron HCl (Zofran)  4 mg IVPUSH Q6H PRN


   PRN Reason: Nausea/Vomiting


Oxycodone HCl (Oxycodone)  5 - 10 mg PO Q4H PRN


   PRN Reason: Pain


   Last Admin: 03/09/21 08:08 Dose:  10 mg


   Documented by: 


Senna (Senna)  8.6 mg PO BID PRN


   PRN Reason: Constipation





Discontinued Medications





Acetaminophen (Tylenol)  975 mg PO ONETIME [facility]


   Stop: 03/08/21 12:00


   Last Admin: 03/08/21 06:31 Dose:  975 mg


   Documented by: 


Bupivacaine HCl (Sensorcaine-Mpf 0.25%) Confirm Administered Dose 10 ml .ROUTE 

.STK-MED ONE


   Stop: 03/08/21 06:49


   Last Admin: 03/08/21 08:37 Dose:  4 ml


   Documented by: 


Cefazolin Sodium (Ancef) Confirm Administered Dose 2 gm .ROUTE .STK-MED ONE


   Stop: 03/08/21 06:37


Morphine Sulfate 8 mg/Epinephrine HCl 0.3 mg/Cefuroxime Sodium 750 mg/Ketorolac 

Tromethamine 30 mg/Sodium Chloride 7.9 ml  0 mg .XX ONETIME [facility]


   Stop: 03/08/21 16:00


   Last Admin: 03/08/21 08:20 Dose:  788.3 mg


   Documented by: 


Ephedrine Sulfate (Ephedrine Sulfate) Confirm Administered Dose 50 mg .ROUTE 

.STK-MED ONE


   Stop: 03/08/21 07:58


Epinephrine HCl (Adrenalin) Confirm Administered Dose 1 mg .ROUTE .STK-MED ONE


   Stop: 03/08/21 07:47


Fentanyl (Sublimaze) Confirm Administered Dose 100 mcg .ROUTE .STK-MED ONE


   Stop: 03/08/21 06:36


Lactated Ringer's (Ringers, Lactated)  1,000 mls @ 125 mls/hr IV ASDIRECTED [facility]


   Stop: 03/08/21 23:00


   Last Admin: 03/08/21 09:29 Dose:  125 mls/hr


   Documented by: 


Lidocaine HCl (Xylocaine-Mpf 1%) Confirm Administered Dose 4 mls @ as directed 

.ROUTE .STK-MED ONE


   Stop: 03/08/21 06:36


Lactated Ringer's (Ringers, Lactated) Confirm Administered Dose 1,000 mls @ as 

directed .ROUTE .STK-MED ONE


   Stop: 03/08/21 07:13


Ketorolac Tromethamine (Toradol) Confirm Administered Dose 30 mg .ROUTE .STK-MED

ONE


   Stop: 03/08/21 06:36


Labetalol HCl (Normodyne) Confirm Administered Dose 100 mg .ROUTE .STK-MED ONE


   Stop: 03/08/21 07:12


Lidocaine/Sodium Bicarbonate (Buffered Lidocaine 1% In Ns 8.4%)  0.25 ml IDERM 

ONETIME PRN


   PRN Reason: Prior to IV Start


   Stop: 03/08/21 18:00


   Last Admin: 03/08/21 06:30 Dose:  0.25 ml


   Documented by: 


Magnesium Oxide (Magnesium Oxide)  400 mg PO ONETIME ONE


   Stop: 03/08/21 15:46


   Last Admin: 03/08/21 16:20 Dose:  400 mg


   Documented by: 


Magnesium Oxide (Magnesium Oxide)  400 mg PO ONETIME ONE


   Stop: 03/08/21 21:01


   Last Admin: 03/08/21 21:23 Dose:  400 mg


   Documented by: 


Midazolam HCl (Versed 1 Mg/Ml) Confirm Administered Dose 2 mg .ROUTE .STK-MED 

ONE


   Stop: 03/08/21 06:36


Ondansetron HCl (Zofran) Confirm Administered Dose 4 mg .ROUTE .STK-MED ONE


   Stop: 03/08/21 06:35


Oxycodone HCl (Oxycontin)  10 mg PO ONETIME [facility]


   Stop: 03/08/21 12:00


   Last Admin: 03/08/21 06:31 Dose:  10 mg


   Documented by: 


Oxycodone HCl (Oxycodone)  5 - 10 mg PO Q4H PRN


   PRN Reason: Pain


Pregabalin (Lyrica)  50 mg PO ONETIME [facility]


   Stop: 03/08/21 12:00


   Last Admin: 03/08/21 06:31 Dose:  50 mg


   Documented by: 


Propofol (Diprivan  20 Ml) Confirm Administered Dose 200 mg .ROUTE .STK-MED ONE


   Stop: 03/08/21 06:35


Propofol (Diprivan  20 Ml) Confirm Administered Dose 200 mg .ROUTE .STK-MED ONE


   Stop: 03/08/21 07:24


Propofol (Diprivan  20 Ml) Confirm Administered Dose 200 mg .ROUTE .STK-MED ONE


   Stop: 03/08/21 07:45


Propofol (Diprivan  20 Ml) Confirm Administered Dose 200 mg .ROUTE .STK-MED ONE


   Stop: 03/08/21 08:06


Propofol (Diprivan  20 Ml) Confirm Administered Dose 200 mg .ROUTE .STK-MED ONE


   Stop: 03/08/21 08:27


Ropivacaine (Naropin 0.5%) Confirm Administered Dose 30 ml .ROUTE .STK-MED ONE


   Stop: 03/08/21 07:46


Sodium Chloride (Saline Flush)  10 ml FLUSH ASDIRECTED PRN


   PRN Reason: Keep Vein Open


   Stop: 03/08/21 18:00


Tranexamic Acid (Cyklokapron) Confirm Administered Dose 1,000 mg .ROUTE .STK-MED

ONE


   Stop: 03/08/21 06:03


   Last Admin: 03/08/21 08:25 Dose:  1,000 mg


   Documented by: 


Triamcinolone Acetonide (Kenalog-40) Confirm Administered Dose 80 mg .ROUTE 

.STK-MED ONE


   Stop: 03/08/21 06:49


   Last Admin: 03/08/21 08:37 Dose:  80 mg


   Documented by: 


Vancomycin HCl (Vancomycin) Confirm Administered Dose 1 gm .ROUTE .STK-MED ONE


   Stop: 03/08/21 06:03


   Last Admin: 03/08/21 08:25 Dose:  1 gm


   Documented by: 











- Exam


Wound/Incisions: Dressing Dry and Intact


General: Alert, Cooperative, No Acute Distress


Lungs: Normal Respiratory Effort


Extremities: Other (NVS intact for RLE.  Joseph's negative.)





Sepsis Event Note





- Evaluation


Sepsis Screening Result: No Definite Risk





- Focused Exam


Vital Signs: 


                                   Vital Signs











  Pulse Resp BP Pulse Ox


 


 03/09/21 08:08    153/99 H 


 


 03/09/21 08:03    153/100 H 


 


 03/09/21 07:52  95  16  167/105 H  95














- Problem List Review


Problem List Initiated/Reviewed/Updated: Yes





- My Orders


Last 24 Hours: 


                               Active Orders 24 hr











 Category Date Time Status


 


 Antiembolic Hose [OM.PC] Per Unit Routine Oth  03/08/21 16:16 Ordered


 


 Ice Therapy [OM.PC] Per Unit Routine Ot  03/08/21 16:15 Ordered








                                Medication Orders





Aspirin (Ecotrin)  325 mg PO BID [facility]


Bisacodyl (Dulcolax)  5 mg PO DAILY PRN


   PRN Reason: Constipation


Cholecalciferol (Vitamin D3)  5,000 unit PO DAILY [facility]


   Last Admin: 03/09/21 08:08  Dose: 5,000 unit


   Documented by: ESTELA


Cyclobenzaprine HCl (Flexeril)  10 mg PO BID PRN


   PRN Reason: Muscle Spasm


Docusate Sodium (Colace)  100 mg PO BID PRN


   PRN Reason: Constipation


Doxazosin Mesylate (Cardura)  4 mg PO DAILY [facility]


   Last Admin: 03/09/21 08:08  Dose: 4 mg


   Documented by: ESTELA


Famotidine (Pepcid)  20 mg PO Q12H [facility]


   Last Admin: 03/09/21 08:08  Dose: 20 mg


   Documented by: ESTELA


   Admin: 03/08/21 21:23  Dose: 20 mg


   Documented by: PRESTON


Cefazolin Sodium/Dextrose 2 gm (/ Premix)  50 mls @ 100 mls/hr IV Q8H [facility]


   Stop: 03/09/21 09:29


   Last Admin: 03/09/21 08:09  Dose: 100 mls/hr


   Documented by: ESTELA


   Infusion: 03/09/21 01:25  Dose: 100 mls/hr


   Documented by: ESTELA


   Admin: 03/09/21 00:55  Dose: 100 mls/hr


   Documented by: PRESTON


   Infusion: 03/08/21 17:41  Dose: 100 mls/hr


   Documented by: PRESTON


   Admin: 03/08/21 17:11  Dose: 100 mls/hr


   Documented by: LEE


Magnesium Hydroxide (Milk Of Magnesia)  30 ml PO BID PRN


   PRN Reason: Constipation


Morphine Sulfate (Morphine)  2 mg IVPUSH Q2H PRN


   PRN Reason: Breakthrough Pain


Multivitamins (Thera)  1 each PO DAILY [facility]


   Last Admin: 03/09/21 08:08  Dose: 1 each


   Documented by: ESTELA


Naloxone HCl (Narcan)  0.1 mg IVPUSH Q5M PRN


   PRN Reason: Oversedation


Ondansetron HCl (Zofran)  4 mg IVPUSH Q6H PRN


   PRN Reason: Nausea/Vomiting


Oxycodone HCl (Oxycodone)  5 - 10 mg PO Q4H PRN


   PRN Reason: Pain


   Last Admin: 03/09/21 08:08  Dose: 10 mg


   Documented by: ESTELA


   Admin: 03/08/21 19:13  Dose: 5 mg


   Documented by: LEE


Senna (Senna)  8.6 mg PO BID PRN


   PRN Reason: Constipation











- Assessment


Assessment           (Free Text/Narrative):: 


POD#1 - right TKA with left knee cortisone injection





- Plan


Plan                        (Free Text/Narrative):: 





1.  Discharge to home today if cleared by Hospitalist service.


2.  Hgb 13.9.


3.  Medical management per Hospitalist service.


4.  Outpatient physical therapy.


5.  325mg ASA PO BID, TEDs, frequent mobility.





The pt's case was discussed with Dr. Landers.

## 2021-03-09 NOTE — OR
DATE OF OPERATION:  03/08/2021

 

SURGEON:  Eder Landers MD

 

OPERATION PERFORMED:  Right total knee arthroplasty with [employer] with left

knee steroid injection.

 

PREOPERATIVE DIAGNOSIS:

Bilateral knee osteoarthrosis.

 

POSTOPERATIVE DIAGNOSIS:

Bilateral knee osteoarthrosis.

 

ANESTHESIA:

Local MAC with spinal.

 

ANESTHESIA PROVIDER:

Silvestre Chavez CRNA.

 

ASSISTANTS:

Jennifer Olsen PA-C, and Gema Combs LPN.

 

ESTIMATED BLOOD LOSS:

150 mL.

 

COMPLICATIONS:

None.

 

CONDITION:

Stable.

 

IMPLANT:

1. Obed size 5 press-fit CR femur.

2. Obed size 5 press-fit tibial baseplate.

3. Mitchell size 5 9 mm CS polyethylene insert.

4. Mitchell size 35 x 10 mm press-fit asymmetric patella.

 

DESCRIPTION OF PROCEDURE:

The patient was identified in the preoperative holding area.  Proper site was

marked and identified by the surgeon.  The patient was brought back to the OR.

After adequate anesthesia, the patient's right lower extremity had a nonsterile

tourniquet applied and then sterilely prepped and draped in the usual sterile

fashion.  OR time-out was performed.  The patient received 2 g IV Ancef.  The

leg vera was then applied to the right lower extremity.  Right lower extremity

was then exsanguinated.  Tourniquet was insufflated to 250 mmHg.  Standard

anterior incision was made.  A medial parapatellar arthrotomy was created.  Deep

fibers of the MCL were raised and anterior fat pad was resected.  Attention was

turned to the patella.  Patella measured 26 and resected to a 15 for 35 x 10 mm

patella.  Drill holes were then drilled and found to be adequate.  Attention was

turned to the femur.  Two 4-0 pins were placed in the femur intra-incisionally

for the [employer] and the array was then placed.  This was then done

on the tibia 3 fingerbreadths below the tibial tubercle and again the array was

placed after the pins were placed in the tibia.  The checkpoints were then

placed on the tibia and the femur.  Hip center of rotation was obtained.  The

medial lateral malleoli were marked.  Next, 40 points was obtained of both the

femur and the tibia using the blue probe for the [employer] display.

Once this was completed, the patient's knee was brought into full extension.

There was noted to be roughly 6 degrees of recurvatum and it was stressed in

varus and valgus stresses at both 0 and 90 degrees.  At this time, we then did

an operative plan for the patient to make symmetric gaps of 19 in both flexion

and extension.  A [employer] robot was then brought in and all cuts were

completed including the tibia anterior and posterior, distal femur cut, and the

posterior chamfer and anterior chamfer cuts.  These were found to be adequate.

All bony pieces were removed.  The medial and lateral menisci were then removed

as well as any posterior osteophytes.  Trial implants of size 5 tibia and size 5

femur were then placed, 9 mm polyethylene was placed.  The patient's knee was

brought into extension.  The patient was noted to have full extension, 0

degrees, and no varus-valgus instability.  At this time, all trial implants were

removed after the femur was drilled and the tibia was stamped and drilled in

proper rotation.  The size 5 tibia was then impacted into place, a size 5 press-

fit femur was impacted into place.  A 9 mm CS polyethylene insert was impacted

into place, and a 35 x 10 mm press-fit patella was press-fit into place.

Tourniquet was deflated.  Bleeders were cauterized.  Periarticular injection was

completed.  400 mL of Irrisept irrigation were irrigated through the knee along

with 1 L pulse lavage irrigation with Ancef.  Topical tranexamic acid and

vancomycin powder were applied inter-incisionally.  #2 barbed suture was used

for closure of the medial parapatellar arthrotomy, 2-0 Vicryl was used

subcutaneously, and Prineo was used for closure of skin.  The patient tolerated

the procedure well and was sent to PACU in stable condition.

 

DD:  03/09/2021 12:07:51

DT:  03/09/2021 12:36:03  [email or web address]

Job #:  [id number]

## 2021-03-09 NOTE — PCM.CONSN
- General Info


Date of Service: 03/09/21


Admission Dx/Problem (Free Text): 


                           Admission Diagnosis/Problem





Admission Diagnosis/Problem      Irregular heart rhythm








Functional Status: Reports: Pain Controlled, Tolerating Diet, Ambulating, 

Urinating, Incentive Spirometry.  Denies: New Symptoms





- Review of Systems


General: Reports: No Symptoms.  Denies: Fever, Chills


HEENT: Reports: No Symptoms.  Denies: Headaches, Sore Throat


Pulmonary: Reports: No Symptoms.  Denies: Shortness of Breath, Cough, Sputum, 

Wheezing


Cardiovascular: Reports: No Symptoms.  Denies: Chest Pain, Palpitations, Dyspnea

on Exertion


Gastrointestinal: Reports: No Symptoms.  Denies: Abdominal Pain, Constipation, 

Diarrhea, Nausea, Vomiting


Genitourinary: Reports: No Symptoms.  Denies: Pain


Musculoskeletal: Reports: Leg Pain


Skin: Reports: No Symptoms.  Denies: Cyanosis


Neurological: Reports: Difficulty Walking, Gait Disturbance.  Denies: Confusion,

Numbness, Pre-Existing Deficit, Tingling


Psychiatric: Reports: No Symptoms





- Patient Data


Vitals - Most Recent: 


                                Last Vital Signs











Temp  99.0 F   03/09/21 03:17


 


Pulse  99   03/09/21 03:17


 


Resp  18   03/09/21 03:17


 


BP  154/85 H  03/09/21 03:17


 


Pulse Ox  96   03/09/21 03:17











Weight - Most Recent: 234 lb


I&O - Last 24 Hours: 


                                 Intake & Output











 03/08/21 03/09/21 03/09/21





 22:59 06:59 14:59


 


Intake Total 250 850 


 


Output Total 0  


 


Balance 250 850 











Lab Results Last 24 Hours: 


                         Laboratory Results - last 24 hr











  03/08/21 03/08/21 03/08/21 Range/Units





  12:05 12:05 16:24 


 


WBC     (4.23-9.07)  K/mm3


 


RBC     (4.63-6.08)  M/mm3


 


Hgb     (13.7-17.5)  gm/dl


 


Hct     (40.1-51.0)  %


 


MCV     (79.0-92.2)  fl


 


MCH     (25.7-32.2)  pg


 


MCHC     (32.2-35.5)  g/dl


 


RDW Std Deviation     (35.1-43.9)  fL


 


Plt Count     (163-337)  K/mm3


 


MPV     (9.4-12.3)  fl


 


Neut % (Auto)     (34.0-67.9)  %


 


Lymph % (Auto)     (21.8-53.1)  %


 


Mono % (Auto)     (5.3-12.2)  %


 


Eos % (Auto)     (0.8-7.0)  


 


Baso % (Auto)     (0.1-1.2)  %


 


Neut # (Auto)     (1.78-5.38)  K/mm3


 


Lymph # (Auto)     (1.32-3.57)  K/mm3


 


Mono # (Auto)     (0.30-0.82)  K/mm3


 


Eos # (Auto)     (0.04-0.54)  K/mm3


 


Baso # (Auto)     (0.01-0.08)  K/mm3


 


Manual Slide Review     


 


Sodium   143   (136-145)  mEq/L


 


Potassium   4.3   (3.5-5.1)  mEq/L


 


Chloride   106   ()  mEq/L


 


Carbon Dioxide   25   (21-32)  mEq/L


 


Anion Gap   16.3 H   (5-15)  


 


BUN   17   (7-18)  mg/dL


 


Creatinine   0.9   (0.7-1.3)  mg/dL


 


Est Cr Clr Drug Dosing   84.47   mL/min


 


Estimated GFR (MDRD)   > 60   (>60)  mL/min


 


BUN/Creatinine Ratio   18.9 H   (14-18)  


 


Glucose   203 H   ()  mg/dL


 


Calcium   8.3 L   (8.5-10.1)  mg/dL


 


Magnesium   1.8   (1.8-2.4)  mg/dl


 


Total Bilirubin   0.3   (0.2-1.0)  mg/dL


 


AST   19   (15-37)  U/L


 


ALT   35   (16-63)  U/L


 


Alkaline Phosphatase   88   ()  U/L


 


Troponin I  < 0.017   < 0.017  (0.00-0.056)  ng/mL


 


Total Protein   6.4   (6.4-8.2)  g/dl


 


Albumin   3.4   (3.4-5.0)  g/dl


 


Globulin   3.0   gm/dL


 


Albumin/Globulin Ratio   1.1   (1-2)  














  03/08/21 03/09/21 03/09/21 Range/Units





  20:00 06:10 06:10 


 


WBC   15.20 H   (4.23-9.07)  K/mm3


 


RBC   4.57 L   (4.63-6.08)  M/mm3


 


Hgb   13.9   (13.7-17.5)  gm/dl


 


Hct   41.7   (40.1-51.0)  %


 


MCV   91.2   (79.0-92.2)  fl


 


MCH   30.4   (25.7-32.2)  pg


 


MCHC   33.3   (32.2-35.5)  g/dl


 


RDW Std Deviation   42.0   (35.1-43.9)  fL


 


Plt Count   258   (163-337)  K/mm3


 


MPV   10.6   (9.4-12.3)  fl


 


Neut % (Auto)   86.9 H   (34.0-67.9)  %


 


Lymph % (Auto)   4.7 L   (21.8-53.1)  %


 


Mono % (Auto)   8.3   (5.3-12.2)  %


 


Eos % (Auto)   0 L   (0.8-7.0)  


 


Baso % (Auto)   0.0 L   (0.1-1.2)  %


 


Neut # (Auto)   13.21 H   (1.78-5.38)  K/mm3


 


Lymph # (Auto)   0.71 L   (1.32-3.57)  K/mm3


 


Mono # (Auto)   1.26 H   (0.30-0.82)  K/mm3


 


Eos # (Auto)   0.00 L   (0.04-0.54)  K/mm3


 


Baso # (Auto)   0.00 L   (0.01-0.08)  K/mm3


 


Manual Slide Review   Abnormal smear   


 


Sodium    142  (136-145)  mEq/L


 


Potassium    4.3  (3.5-5.1)  mEq/L


 


Chloride    105  ()  mEq/L


 


Carbon Dioxide    27  (21-32)  mEq/L


 


Anion Gap    14.3  (5-15)  


 


BUN    15  (7-18)  mg/dL


 


Creatinine    1.0  (0.7-1.3)  mg/dL


 


Est Cr Clr Drug Dosing    76.02  mL/min


 


Estimated GFR (MDRD)    > 60  (>60)  mL/min


 


BUN/Creatinine Ratio    15.0  (14-18)  


 


Glucose    199 H  ()  mg/dL


 


Calcium    8.8  (8.5-10.1)  mg/dL


 


Magnesium     (1.8-2.4)  mg/dl


 


Total Bilirubin    0.5  (0.2-1.0)  mg/dL


 


AST    19  (15-37)  U/L


 


ALT    31  (16-63)  U/L


 


Alkaline Phosphatase    81  ()  U/L


 


Troponin I  < 0.017    (0.00-0.056)  ng/mL


 


Total Protein    6.6  (6.4-8.2)  g/dl


 


Albumin    3.4  (3.4-5.0)  g/dl


 


Globulin    3.2  gm/dL


 


Albumin/Globulin Ratio    1.1  (1-2)  











Med Orders - Current: 


                               Current Medications





Aspirin (Ecotrin)  325 mg PO BID [facility]


Bisacodyl (Dulcolax)  5 mg PO DAILY PRN


   PRN Reason: Constipation


Cholecalciferol (Vitamin D3)  5,000 unit PO DAILY [facility]


Cyclobenzaprine HCl (Flexeril)  10 mg PO BID PRN


   PRN Reason: Muscle Spasm


Docusate Sodium (Colace)  100 mg PO BID PRN


   PRN Reason: Constipation


Doxazosin Mesylate (Cardura)  4 mg PO DAILY [facility]


Famotidine (Pepcid)  20 mg PO Q12H [facility]


   Last Admin: 03/08/21 21:23 Dose:  20 mg


   Documented by: 


Cefazolin Sodium/Dextrose 2 gm (/ Premix)  50 mls @ 100 mls/hr IV Q8H [facility]


   Stop: 03/09/21 09:29


   Last Admin: 03/09/21 00:55 Dose:  100 mls/hr


   Documented by: 


Magnesium Hydroxide (Milk Of Magnesia)  30 ml PO BID PRN


   PRN Reason: Constipation


Morphine Sulfate (Morphine)  2 mg IVPUSH Q2H PRN


   PRN Reason: Breakthrough Pain


Multivitamins (Thera)  1 each PO DAILY [facility]


Naloxone HCl (Narcan)  0.1 mg IVPUSH Q5M PRN


   PRN Reason: Oversedation


Ondansetron HCl (Zofran)  4 mg IVPUSH Q6H PRN


   PRN Reason: Nausea/Vomiting


Oxycodone HCl (Oxycodone)  5 - 10 mg PO Q4H PRN


   PRN Reason: Pain


   Last Admin: 03/08/21 19:13 Dose:  5 mg


   Documented by: 


Senna (Senna)  8.6 mg PO BID PRN


   PRN Reason: Constipation





Discontinued Medications





Acetaminophen (Tylenol)  975 mg PO ONETIME [facility]


   Stop: 03/08/21 12:00


   Last Admin: 03/08/21 06:31 Dose:  975 mg


   Documented by: 


Bupivacaine HCl (Sensorcaine-Mpf 0.25%) Confirm Administered Dose 10 ml .ROUTE 

.STK-MED ONE


   Stop: 03/08/21 06:49


   Last Admin: 03/08/21 08:37 Dose:  4 ml


   Documented by: 


Cefazolin Sodium (Ancef) Confirm Administered Dose 2 gm .ROUTE .STK-MED ONE


   Stop: 03/08/21 06:37


Morphine Sulfate 8 mg/Epinephrine HCl 0.3 mg/Cefuroxime Sodium 750 mg/Ketorolac 

Tromethamine 30 mg/Sodium Chloride 7.9 ml  0 mg .XX ONETIME [facility]


   Stop: 03/08/21 16:00


   Last Admin: 03/08/21 08:20 Dose:  788.3 mg


   Documented by: 


Ephedrine Sulfate (Ephedrine Sulfate) Confirm Administered Dose 50 mg .ROUTE 

.STK-MED ONE


   Stop: 03/08/21 07:58


Epinephrine HCl (Adrenalin) Confirm Administered Dose 1 mg .ROUTE .STK-MED ONE


   Stop: 03/08/21 07:47


Fentanyl (Sublimaze) Confirm Administered Dose 100 mcg .ROUTE .STK-MED ONE


   Stop: 03/08/21 06:36


Lactated Ringer's (Ringers, Lactated)  1,000 mls @ 125 mls/hr IV ASDIRECTED [facility]


   Stop: 03/08/21 23:00


   Last Admin: 03/08/21 09:29 Dose:  125 mls/hr


   Documented by: 


Lidocaine HCl (Xylocaine-Mpf 1%) Confirm Administered Dose 4 mls @ as directed 

.ROUTE .STK-MED ONE


   Stop: 03/08/21 06:36


Lactated Ringer's (Ringers, Lactated) Confirm Administered Dose 1,000 mls @ as 

directed .ROUTE .STK-MED ONE


   Stop: 03/08/21 07:13


Ketorolac Tromethamine (Toradol) Confirm Administered Dose 30 mg .ROUTE .STK-MED

ONE


   Stop: 03/08/21 06:36


Labetalol HCl (Normodyne) Confirm Administered Dose 100 mg .ROUTE .STK-MED ONE


   Stop: 03/08/21 07:12


Lidocaine/Sodium Bicarbonate (Buffered Lidocaine 1% In Ns 8.4%)  0.25 ml IDERM 

ONETIME PRN


   PRN Reason: Prior to IV Start


   Stop: 03/08/21 18:00


   Last Admin: 03/08/21 06:30 Dose:  0.25 ml


   Documented by: 


Magnesium Oxide (Magnesium Oxide)  400 mg PO ONETIME ONE


   Stop: 03/08/21 15:46


   Last Admin: 03/08/21 16:20 Dose:  400 mg


   Documented by: 


Magnesium Oxide (Magnesium Oxide)  400 mg PO ONETIME ONE


   Stop: 03/08/21 21:01


   Last Admin: 03/08/21 21:23 Dose:  400 mg


   Documented by: 


Midazolam HCl (Versed 1 Mg/Ml) Confirm Administered Dose 2 mg .ROUTE .STK-MED 

ONE


   Stop: 03/08/21 06:36


Ondansetron HCl (Zofran) Confirm Administered Dose 4 mg .ROUTE .STK-MED ONE


   Stop: 03/08/21 06:35


Oxycodone HCl (Oxycontin)  10 mg PO ONETIME [facility]


   Stop: 03/08/21 12:00


   Last Admin: 03/08/21 06:31 Dose:  10 mg


   Documented by: 


Oxycodone HCl (Oxycodone)  5 - 10 mg PO Q4H PRN


   PRN Reason: Pain


Pregabalin (Lyrica)  50 mg PO ONETIME [facility]


   Stop: 03/08/21 12:00


   Last Admin: 03/08/21 06:31 Dose:  50 mg


   Documented by: 


Propofol (Diprivan  20 Ml) Confirm Administered Dose 200 mg .ROUTE .STK-MED ONE


   Stop: 03/08/21 06:35


Propofol (Diprivan  20 Ml) Confirm Administered Dose 200 mg .ROUTE .STK-MED ONE


   Stop: 03/08/21 07:24


Propofol (Diprivan  20 Ml) Confirm Administered Dose 200 mg .ROUTE .STK-MED ONE


   Stop: 03/08/21 07:45


Propofol (Diprivan  20 Ml) Confirm Administered Dose 200 mg .ROUTE .STK-MED ONE


   Stop: 03/08/21 08:06


Propofol (Diprivan  20 Ml) Confirm Administered Dose 200 mg .ROUTE .STK-MED ONE


   Stop: 03/08/21 08:27


Ropivacaine (Naropin 0.5%) Confirm Administered Dose 30 ml .ROUTE .STK-MED ONE


   Stop: 03/08/21 07:46


Sodium Chloride (Saline Flush)  10 ml FLUSH ASDIRECTED PRN


   PRN Reason: Keep Vein Open


   Stop: 03/08/21 18:00


Tranexamic Acid (Cyklokapron) Confirm Administered Dose 1,000 mg .ROUTE .STK-MED

ONE


   Stop: 03/08/21 06:03


   Last Admin: 03/08/21 08:25 Dose:  1,000 mg


   Documented by: 


Triamcinolone Acetonide (Kenalog-40) Confirm Administered Dose 80 mg .ROUTE 

.STK-MED ONE


   Stop: 03/08/21 06:49


   Last Admin: 03/08/21 08:37 Dose:  80 mg


   Documented by: 


Vancomycin HCl (Vancomycin) Confirm Administered Dose 1 gm .ROUTE .STK-MED ONE


   Stop: 03/08/21 06:03


   Last Admin: 03/08/21 08:25 Dose:  1 gm


   Documented by: 











- Exam


Quality Assessment: DVT Prophylaxis.  No: Supplemental Oxygen, Urine Catheter


General: Alert, Oriented, Cooperative, No Acute Distress


HEENT: Pupils Equal, Pupils Reactive, Mucous Membr. Moist/Pink


Neck: Supple, Trachea Midline


Lungs: Clear to Auscultation, Normal Respiratory Effort


Cardiovascular: Regular Rate, Regular Rhythm, Other (Occasional PACs )


GI/Abdominal Exam: Normal Bowel Sounds, Soft, Non-Tender, No Distention


Back Exam: Normal Inspection, Full Range of Motion


Extremities: Leg Pain, Limited Range of Motion, Other (Bandage in place on left 

leg. Cooling pack in place. )


Peripheral Pulses: 2+: Radial (L), Radial (R), Dorsalis Pedis (L), Dorsalis 

Pedis (R)


Skin: Warm, Dry, Intact


Wound/Incisions: Dressing Dry and Intact


Neurological: No New Focal Deficit


Psy/Mental Status: Alert, Normal Affect, Normal Mood





Sepsis Event Note





- Evaluation


Sepsis Screening Result: No Definite Risk





- Focused Exam


Vital Signs: 


                                   Vital Signs











  Temp Pulse Resp BP Pulse Ox


 


 03/09/21 03:17  99.0 F  99  18  154/85 H  96


 


 03/08/21 23:27  98.4 F    


 


 03/08/21 23:25   95  20  129/93 H  95


 


 03/08/21 19:34   98  14  147/82 H  94 L














Consult PN Assessment/Plan


POD#: 1


Procedures: 


Procedures





CT LOWER EXTREMITY W/O DYE (01/29/21)








(1) S/P total knee arthroplasty


SNOMED Code(s): 8658863500548, 380995857, 2419425451841


   Code(s): Z96.659 - PRESENCE OF UNSPECIFIED ARTIFICIAL KNEE JOINT   Priority: 

High   Current Visit: Yes   


Qualifiers: 


   Laterality: right   Qualified Code(s): Z96.651 - Presence of right artificial

knee joint   





(2) BPH (benign prostatic hyperplasia)


SNOMED Code(s): 957503965


   Code(s): N40.0 - BENIGN PROSTATIC HYPERPLASIA WITHOUT LOWER URINRY TRACT SYMP

  Priority: Low   Current Visit: No   


Qualifiers: 


   Lower urinary tract symptom presence: symptoms absent   Qualified Code(s): 

N40.0 - Benign prostatic hyperplasia without lower urinary tract symptoms   





(3) Obesity (BMI 30-39.9)


SNOMED Code(s): 828083676, 049077321


   Code(s): E66.9 - OBESITY, UNSPECIFIED   Priority: Medium   Current Visit: No 

 





(4) HTN (hypertension)


SNOMED Code(s): 62359233


   Code(s): I10 - ESSENTIAL (PRIMARY) HYPERTENSION   Priority: Medium   Current 

Visit: No   


Qualifiers: 


   Hypertension type: unspecified   Qualified Code(s): I10 - Essential (primary)

hypertension   





(5) Hyperglycemia


SNOMED Code(s): 75211122


   Code(s): R73.9 - HYPERGLYCEMIA, UNSPECIFIED   Priority: Medium   Current 

Visit: No   





(6) Near syncope


SNOMED Code(s): 291816664


   Code(s): R55 - SYNCOPE AND COLLAPSE   Priority: High   Current Visit: Yes   





(7) PAC (premature atrial contraction)


SNOMED Code(s): 957916927


   Code(s): I49.1 - ATRIAL PREMATURE DEPOLARIZATION   Priority: High   Current V

isit: Yes   





(8) First degree AV block


SNOMED Code(s): 681710723


   Code(s): I44.0 - ATRIOVENTRICULAR BLOCK, FIRST DEGREE   Priority: High   

Current Visit: Yes   


Problem List Initiated/Reviewed/Updated: Yes


My Orders Last 24 Hours: 


My Active Orders





03/08/21 11:42


Consult to Physician [CONS] Urgent 





03/08/21 12:07


Notify Provider Consults [RC] ASDIRECTED 





03/08/21 12:56


Consult to Occupational Therapy [OT Evaluation and Treatment] [CONS] Routine 


PT Evaluation and Treatment [CONS] Routine 





03/08/21 13:08


Patient Status [ADT] Routine 


Code Status [Resuscitation Status] Routine 





03/08/21 13:09


Incentive Spirometry [RT Incentive Spirometry] [RC] ASDIRECTED 





03/09/21 06:10


CMP [COMPREHENSIVE METABOLIC PN,CMP] [CHEM] AM 


MAGNESIUM [CHEM] AM 





03/09/21 09:00


Cholecalciferol (Vitamin D3) [Vitamin D3]   5,000 unit PO DAILY 


Doxazosin [Cardura]   4 mg PO DAILY 


Multivitamins,Therapeutic [Thera]   1 each PO DAILY 





03/10/21 05:11


CBC WITH AUTO DIFF [HEME] AM 


CMP [COMPREHENSIVE METABOLIC PN,CMP] [CHEM] AM 


MAGNESIUM [CHEM] AM 





03/11/21 05:11


CBC WITH AUTO DIFF [HEME] AM 


CMP [COMPREHENSIVE METABOLIC PN,CMP] [CHEM] AM 


MAGNESIUM [CHEM] AM 





03/12/21 05:11


CBC WITH AUTO DIFF [HEME] AM 


MAGNESIUM [CHEM] AM 











Plan: 


I/P:





Acute:





S/P right total knee arthroplasty - post-operative day 1


   -DVT prophylaxis and pain management per primary care team


   -PT/OT 


   -IS/RT


   -Monitor oxygen saturation


   -Titrate oxygen as needed


   -Home medications reviewed


   -Vital signs stable 


   -Monitor labs


      -Pre-operative Hgb was 14.8; Now 13.9


      -Pre-operative GFR was 81; Now >60


      -Pre-operative A1C was 6.4





Osteoarthritis of bilateral knees


   -Pain management per primary care team





S/P left knee steroid injection


   -Management per primary team





Near-syncope


Frequent PACs


First degree HB


   -Troponin negative x3


   -Telemetry


   -Check electrolytes


      -Magnesium 1.8 on day of surgery - supplemented PO


   -Observation status


   -Repeat 12-lead EKG shows no change


   -Recent stress test negative 





Chronic:


BPH


Obesity


HTN


Hyperglycemia





Plan:


CM for discharge planning


GI prophylaxis 


Home medications as indicated


Other orders as listed above 


Routine AM labs





He is a full code. His PCP is Pat Fraser NP





From a hospitalist status Debora is doing quite well.  He has been tachycardic 

with ambulation/pain and occasionally was having fairly frequent PACs but these 

have improved. Otherwise he is doing well.  Denies any chest pain.  Troponins 

have been negative x3.  Repeat twelve-lead EKG was obtained and was negative.  

He reports no nausea, dizziness, chest pain, shortness of breath, or near 

syncope episodes since his initial episode.  He has been up working with 

therapies and is doing well.  He has voided and is off of oxygen.  He is 

utilizing his incentive spirometer. Labs and vital signs remained stable.  No 

nursing or patient concerns.  Patient magnesium was noted to be 1.8 when tested 

yesterday and he was given 400 mg twice daily magnesium supplementation x2 

doses.  He is 2.1 today.  Recommend patient follow-up with primary care provider

 within 5 to 7 days of discharge, sooner if needed.  Recommend PCP recheck 

magnesium.  Consider repeat twelve-lead EKG at that appointment.  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





Thank you for allowing us to participate in the care of this patient!!

## 2021-03-10 NOTE — PCM.EKG
** #1 Interpretation


EKG Date: 03/08/21


Time: 18:58


Rhythm: NSR (Plan tachycardia with frequent premature atrial contraction)


Axis: Normal


P-Wave: Present


QRS: Normal


ST-T: Normal


QT: Normal (Normal EKG except for multiple premature atrial contractions)

## 2022-01-03 ENCOUNTER — HOSPITAL ENCOUNTER (OUTPATIENT)
Dept: HOSPITAL 41 - JD.SDS | Age: 52
Discharge: HOME | End: 2022-01-03
Attending: ORTHOPAEDIC SURGERY
Payer: COMMERCIAL

## 2022-01-03 DIAGNOSIS — R35.1: ICD-10-CM

## 2022-01-03 DIAGNOSIS — N40.1: ICD-10-CM

## 2022-01-03 DIAGNOSIS — E66.9: ICD-10-CM

## 2022-01-03 DIAGNOSIS — Z98.890: ICD-10-CM

## 2022-01-03 DIAGNOSIS — K21.9: ICD-10-CM

## 2022-01-03 DIAGNOSIS — Z79.82: ICD-10-CM

## 2022-01-03 DIAGNOSIS — I10: ICD-10-CM

## 2022-01-03 DIAGNOSIS — Z79.899: ICD-10-CM

## 2022-01-03 DIAGNOSIS — M17.12: Primary | ICD-10-CM

## 2022-01-03 PROCEDURE — 73560 X-RAY EXAM OF KNEE 1 OR 2: CPT

## 2022-01-03 PROCEDURE — 97161 PT EVAL LOW COMPLEX 20 MIN: CPT

## 2022-01-03 PROCEDURE — C1713 ANCHOR/SCREW BN/BN,TIS/BN: HCPCS

## 2022-01-03 PROCEDURE — C1776 JOINT DEVICE (IMPLANTABLE): HCPCS

## 2022-01-03 PROCEDURE — 27447 TOTAL KNEE ARTHROPLASTY: CPT

## 2022-01-03 PROCEDURE — 97116 GAIT TRAINING THERAPY: CPT

## 2022-01-03 RX ADMIN — DEXTROSE ONE GM: 5 SOLUTION INTRAVENOUS at 14:08

## 2022-01-03 RX ADMIN — DEXTROSE ONE GM: 5 SOLUTION INTRAVENOUS at 14:16
